# Patient Record
Sex: MALE | Race: WHITE
[De-identification: names, ages, dates, MRNs, and addresses within clinical notes are randomized per-mention and may not be internally consistent; named-entity substitution may affect disease eponyms.]

---

## 2019-08-30 PROBLEM — Z00.00 ENCOUNTER FOR PREVENTIVE HEALTH EXAMINATION: Status: ACTIVE | Noted: 2019-08-30

## 2019-09-13 ENCOUNTER — APPOINTMENT (OUTPATIENT)
Dept: NEUROSURGERY | Facility: CLINIC | Age: 54
End: 2019-09-13
Payer: MEDICARE

## 2019-09-13 VITALS
WEIGHT: 230 LBS | DIASTOLIC BLOOD PRESSURE: 89 MMHG | TEMPERATURE: 98.4 F | SYSTOLIC BLOOD PRESSURE: 139 MMHG | HEIGHT: 72 IN | HEART RATE: 87 BPM | BODY MASS INDEX: 31.15 KG/M2

## 2019-09-13 DIAGNOSIS — Z87.438 PERSONAL HISTORY OF OTHER DISEASES OF MALE GENITAL ORGANS: ICD-10-CM

## 2019-09-13 DIAGNOSIS — Z87.891 PERSONAL HISTORY OF NICOTINE DEPENDENCE: ICD-10-CM

## 2019-09-13 DIAGNOSIS — Z86.69 PERSONAL HISTORY OF OTHER DISEASES OF THE NERVOUS SYSTEM AND SENSE ORGANS: ICD-10-CM

## 2019-09-13 DIAGNOSIS — Z87.19 PERSONAL HISTORY OF OTHER DISEASES OF THE DIGESTIVE SYSTEM: ICD-10-CM

## 2019-09-13 PROCEDURE — 99205 OFFICE O/P NEW HI 60 MIN: CPT

## 2019-09-13 RX ORDER — DEXTROAMPHETAMINE SACCHARATE, AMPHETAMINE ASPARTATE, DEXTROAMPHETAMINE SULFATE, AND AMPHETAMINE SULFATE 7.5; 7.5; 7.5; 7.5 MG/1; MG/1; MG/1; MG/1
30 TABLET ORAL
Refills: 0 | Status: ACTIVE | COMMUNITY

## 2019-09-13 RX ORDER — OMEPRAZOLE 40 MG/1
40 CAPSULE, DELAYED RELEASE ORAL
Refills: 0 | Status: ACTIVE | COMMUNITY

## 2019-09-13 RX ORDER — IBUPROFEN 200 MG/1
TABLET, COATED ORAL
Refills: 0 | Status: ACTIVE | COMMUNITY

## 2019-09-17 NOTE — ASSESSMENT
[FreeTextEntry1] : 2019\par \par Humberto Osorio MD\par [address] [place] [address], [address]\[place] [address]\par \par Re:	Patrick Chapman\par :	1965\par \par Dear Dr. Osorio:\par \par I saw Mr. Patrick Chapman in neurosurgical consultation today.  For the last year, he has had bouts of dizziness, and this has increased recently.  He has had nausea for 6 months.  He describes it as “vertigo, dizziness, and lightheadedness.”  It has awoken him from sleep.  It has interfered with his job, and there has been nausea and vomiting associated with this.  According to the patient, audiology showed decreased hearing on the left side (the lesion is on the right side).  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  \par \par The patient’s past medical history is unremarkable.  Past surgical history is positive for right hand surgery.  He has no allergies.  Social history is negative.  He is an old cigarette smoker and works as an .  His review of systems is negative except for chronic left-sided tinnitus.  Family history is negative.  He has a normal neurological examination.  Gait and station are normal and there are no other findings.\par \par IMPRESSION: I believe this small lesion is symptomatic and causing his symptoms, and I would favor radiosurgery for this lesion.  The family is on board with this.  I have given them the name of Dr[facility], and they will be seeing him.  They will also obtain a 2nd opinion from Dr. Matt Pereira to substantiate this route of treatment.\par \par In summary, I think that he has a small symptomatic intracanalicular vestibular schwannoma, and I would advise radiosurgery.  I thank you for the confidence and courtesy of this referral.\par \par Sincerely,\par \par \par \par Patrick Hansen M.D., F.A.C.S.\par [facility]\par \par \par

## 2019-09-17 NOTE — HISTORY OF PRESENT ILLNESS
[de-identified] : 55 yo female presents to the office for a neurosurgical consultation for a 5. cm enhancing lesion in the intracanalicular region for acoustic neuroma.\par Pt has had c/o of lightheadedness, vertigo with nausea and vomiting which is progressively worse.\par THis past week it happened at work and pt saw ENT Dr. Osorio who ordered MRI with IAC w and without contrast which showed this small lesion.\par He had a balance test and hearing test by ENT which showed decrease hearing on left, not on the right.\par Pt does c/o tinnitus on left.\par Risks and benefits of 3 options discussed with managing it conservatively, removing it surgically or radiation.\par \par Plan: consult for radiation treatment\par Consult with Dr. Ojeda and Dr. Pereira [FreeTextEntry1] : Acoustic Neuroma

## 2019-09-17 NOTE — PHYSICAL EXAM
[General Appearance - Alert] : alert [General Appearance - In No Acute Distress] : in no acute distress [Oriented To Time, Place, And Person] : oriented to person, place, and time [Impaired Insight] : insight and judgment were intact [Affect] : the affect was normal [Person] : oriented to person [Place] : oriented to place [Time] : oriented to time [Short Term Intact] : short term memory intact [Remote Intact] : remote memory intact [Span Intact] : the attention span was normal [Concentration Intact] : normal concentrating ability [Fluency] : fluency intact [Comprehension] : comprehension intact [Current Events] : adequate knowledge of current events [Past History] : adequate knowledge of personal past history [Vocabulary] : adequate range of vocabulary [Cranial Nerves Trigeminal (V)] : facial sensation intact symmetrically [Cranial Nerves Oculomotor (III)] : extraocular motion intact [Cranial Nerves Vestibulocochlear (VIII)] : hearing was intact bilaterally [Cranial Nerves Facial (VII)] : face symmetrical [Cranial Nerves Glossopharyngeal (IX)] : tongue and palate midline [Cranial Nerves Accessory (XI - Cranial And Spinal)] : head turning and shoulder shrug symmetric [Motor Strength] : muscle strength was normal in all four extremities [Motor Tone] : muscle tone was normal in all four extremities [Cranial Nerves Hypoglossal (XII)] : there was no tongue deviation with protrusion [No Muscle Atrophy] : normal bulk in all four extremities [Sensation Tactile Decrease] : light touch was intact [Motor Handedness Right-Handed] : the patient is right hand dominant [Balance] : balance was intact [Limited Balance] : the patient's balance was impaired [2+] : Patella left 2+ [Extraocular Movements] : extraocular movements were intact [Neck Appearance] : the appearance of the neck was normal [] : no respiratory distress [Outer Ear] : the ears and nose were normal in appearance [Exaggerated Use Of Accessory Muscles For Inspiration] : no accessory muscle use [Respiration, Rhythm And Depth] : normal respiratory rhythm and effort [Heart Rate And Rhythm] : heart rate was normal and rhythm regular [Abnormal Walk] : normal gait [Skin Turgor] : normal skin turgor [Involuntary Movements] : no involuntary movements were seen [Skin Color & Pigmentation] : normal skin color and pigmentation [Past-pointing] : there was no past-pointing [Tremor] : no tremor present

## 2019-09-24 ENCOUNTER — APPOINTMENT (OUTPATIENT)
Dept: RADIATION ONCOLOGY | Facility: CLINIC | Age: 54
End: 2019-09-24
Payer: MEDICARE

## 2019-09-24 VITALS
DIASTOLIC BLOOD PRESSURE: 89 MMHG | HEART RATE: 87 BPM | BODY MASS INDEX: 32.35 KG/M2 | SYSTOLIC BLOOD PRESSURE: 126 MMHG | RESPIRATION RATE: 16 BRPM | HEIGHT: 72 IN | OXYGEN SATURATION: 97 % | WEIGHT: 238.85 LBS | TEMPERATURE: 98.4 F

## 2019-09-24 DIAGNOSIS — Z80.0 FAMILY HISTORY OF MALIGNANT NEOPLASM OF DIGESTIVE ORGANS: ICD-10-CM

## 2019-09-24 PROCEDURE — 99205 OFFICE O/P NEW HI 60 MIN: CPT | Mod: 25,GC

## 2019-09-24 RX ORDER — PREDNISONE 10 MG/1
10 TABLET ORAL
Qty: 60 | Refills: 0 | Status: ACTIVE | COMMUNITY
Start: 2019-09-24 | End: 1900-01-01

## 2019-09-24 NOTE — PHYSICAL EXAM
[Sclera] : the sclera and conjunctiva were normal [Outer Ear] : the ears and nose were normal in appearance [Extraocular Movements] : extraocular movements were intact [Normal] : no focal deficits

## 2019-09-24 NOTE — VITALS
[Maximal Pain Intensity: 5/10] : 5/10 [Least Pain Intensity: 0/10] : 0/10 [90: Minor restrictions in physically strenous activity.] : 90: Minor restrictions in physically strenuous activity.

## 2019-09-25 ENCOUNTER — TRANSCRIPTION ENCOUNTER (OUTPATIENT)
Age: 54
End: 2019-09-25

## 2019-09-26 NOTE — HISTORY OF PRESENT ILLNESS
[FreeTextEntry1] : Mr. Chapman presents for evaluation of definitive treatment for a radiographically diagnosed vestibular schwannoma.  \par \par The patient has had increasing dizziness for the past 1.5 years. He has noticed new and increased left sided tinnitus with crinkling noises that led him to visit an ENT.  \par \par This august a brain MRI was ordered for him.  On 8/20/19 MRI brain report states right sided 0.5 cm acoustic neuroma.\par \par Today in clinic, the patient states that the vertigo happens randomly and awakens him from sleep. He notes they happened episodically in the past but are more frequent now.  Each episode will begin with a sensation of what he describes as vertigo after which he will get diaphoretic and lay down.  It can last for up to two hours.  He states that the vertigo and hearing problems have significantly decreased his quality of life.   He is having these debilitating vertigo episodes daily now. \par \par He was suggested that his symptoms are likely secondary to the acoustic neuroma.  He presents to discuss treatment options.

## 2019-09-26 NOTE — DATA REVIEWED
[FreeTextEntry1] : I have personally reviewed the most recent MRI and compared it with the previous scans.\par

## 2019-09-26 NOTE — REVIEW OF SYSTEMS
[Loss of Hearing] : loss of hearing [Vomiting] : vomiting [Dizziness] : dizziness [Negative] : Genitourinary [Fever] : no fever [Chills] : no chills [Night Sweats] : no night sweats [Palpitations] : no palpitations [Chest Pain] : no chest pain [Joint Pain] : no joint pain [Cough] : no cough [Shortness Of Breath] : no shortness of breath [Confused] : no confusion [Fainting] : no fainting [Difficulty Walking] : no difficulty walking [FreeTextEntry4] : tinnitus and hearing loss mostly on the left side [FreeTextEntry7] : nausea and vomiting when dizzy [de-identified] : denies weakness to face [de-identified] : moods not as good as they usually are

## 2019-10-10 ENCOUNTER — TRANSCRIPTION ENCOUNTER (OUTPATIENT)
Age: 54
End: 2019-10-10

## 2019-10-16 ENCOUNTER — APPOINTMENT (OUTPATIENT)
Dept: SPINE | Facility: CLINIC | Age: 54
End: 2019-10-16
Payer: MEDICARE

## 2019-10-16 VITALS
BODY MASS INDEX: 32.23 KG/M2 | HEART RATE: 82 BPM | WEIGHT: 238 LBS | HEIGHT: 72 IN | DIASTOLIC BLOOD PRESSURE: 89 MMHG | SYSTOLIC BLOOD PRESSURE: 128 MMHG

## 2019-10-16 DIAGNOSIS — D33.3 BENIGN NEOPLASM OF CRANIAL NERVES: ICD-10-CM

## 2019-10-16 DIAGNOSIS — Z78.9 OTHER SPECIFIED HEALTH STATUS: ICD-10-CM

## 2019-10-16 DIAGNOSIS — Z82.49 FAMILY HISTORY OF ISCHEMIC HEART DISEASE AND OTHER DISEASES OF THE CIRCULATORY SYSTEM: ICD-10-CM

## 2019-10-16 PROCEDURE — 99213 OFFICE O/P EST LOW 20 MIN: CPT

## 2019-10-16 RX ORDER — MECLIZINE HCL 25 MG/1
25 TABLET ORAL
Refills: 0 | Status: DISCONTINUED | COMMUNITY
Start: 2019-09-24 | End: 2019-10-16

## 2019-10-16 RX ORDER — CHLORHEXIDINE GLUCONATE 4 %
LIQUID (ML) TOPICAL
Refills: 0 | Status: ACTIVE | COMMUNITY

## 2019-10-24 ENCOUNTER — APPOINTMENT (OUTPATIENT)
Dept: SPINE | Facility: AMBULATORY SURGERY CENTER | Age: 54
End: 2019-10-24

## 2019-10-24 ENCOUNTER — APPOINTMENT (OUTPATIENT)
Dept: MRI IMAGING | Facility: IMAGING CENTER | Age: 54
End: 2019-10-24

## 2020-04-27 ENCOUNTER — APPOINTMENT (OUTPATIENT)
Dept: MRI IMAGING | Facility: CLINIC | Age: 55
End: 2020-04-27

## 2020-06-17 ENCOUNTER — APPOINTMENT (OUTPATIENT)
Dept: SPINE | Facility: CLINIC | Age: 55
End: 2020-06-17